# Patient Record
Sex: FEMALE | Race: OTHER | HISPANIC OR LATINO
[De-identification: names, ages, dates, MRNs, and addresses within clinical notes are randomized per-mention and may not be internally consistent; named-entity substitution may affect disease eponyms.]

---

## 2018-02-08 VITALS
HEIGHT: 65 IN | WEIGHT: 108.69 LBS | OXYGEN SATURATION: 100 % | HEART RATE: 65 BPM | DIASTOLIC BLOOD PRESSURE: 70 MMHG | SYSTOLIC BLOOD PRESSURE: 153 MMHG | TEMPERATURE: 98 F | RESPIRATION RATE: 18 BRPM

## 2018-02-09 ENCOUNTER — RESULT REVIEW (OUTPATIENT)
Age: 68
End: 2018-02-09

## 2018-02-09 ENCOUNTER — OUTPATIENT (OUTPATIENT)
Dept: OUTPATIENT SERVICES | Facility: HOSPITAL | Age: 68
LOS: 1 days | Discharge: HOME | End: 2018-02-09
Payer: MEDICARE

## 2018-02-09 DIAGNOSIS — Z98.890 OTHER SPECIFIED POSTPROCEDURAL STATES: Chronic | ICD-10-CM

## 2018-02-09 LAB — CALCIUM SERPL-MCNC: 8.6 MG/DL — SIGNIFICANT CHANGE UP (ref 8.4–10.5)

## 2018-02-09 RX ORDER — MORPHINE SULFATE 50 MG/1
2 CAPSULE, EXTENDED RELEASE ORAL
Qty: 0 | Refills: 0 | Status: DISCONTINUED | OUTPATIENT
Start: 2018-02-09 | End: 2018-02-10

## 2018-02-09 RX ORDER — SENNA PLUS 8.6 MG/1
2 TABLET ORAL AT BEDTIME
Qty: 0 | Refills: 0 | Status: DISCONTINUED | OUTPATIENT
Start: 2018-02-09 | End: 2018-02-10

## 2018-02-09 RX ORDER — LACTOBACILLUS ACIDOPHILUS 100MM CELL
1 CAPSULE ORAL
Qty: 0 | Refills: 0 | Status: DISCONTINUED | OUTPATIENT
Start: 2018-02-09 | End: 2018-02-10

## 2018-02-09 RX ORDER — SODIUM CHLORIDE 9 MG/ML
1000 INJECTION, SOLUTION INTRAVENOUS
Qty: 0 | Refills: 0 | Status: DISCONTINUED | OUTPATIENT
Start: 2018-02-09 | End: 2018-02-10

## 2018-02-09 RX ORDER — ONDANSETRON 8 MG/1
4 TABLET, FILM COATED ORAL EVERY 4 HOURS
Qty: 0 | Refills: 0 | Status: DISCONTINUED | OUTPATIENT
Start: 2018-02-09 | End: 2018-02-10

## 2018-02-09 RX ORDER — SIMVASTATIN 20 MG/1
40 TABLET, FILM COATED ORAL AT BEDTIME
Qty: 0 | Refills: 0 | Status: DISCONTINUED | OUTPATIENT
Start: 2018-02-09 | End: 2018-02-10

## 2018-02-09 RX ORDER — DOCUSATE SODIUM 100 MG
100 CAPSULE ORAL THREE TIMES A DAY
Qty: 0 | Refills: 0 | Status: DISCONTINUED | OUTPATIENT
Start: 2018-02-09 | End: 2018-02-10

## 2018-02-09 RX ORDER — LABETALOL HCL 100 MG
10 TABLET ORAL EVERY 4 HOURS
Qty: 0 | Refills: 0 | Status: DISCONTINUED | OUTPATIENT
Start: 2018-02-09 | End: 2018-02-10

## 2018-02-09 RX ORDER — BENZOCAINE AND MENTHOL 5; 1 G/100ML; G/100ML
1 LIQUID ORAL
Qty: 0 | Refills: 0 | Status: DISCONTINUED | OUTPATIENT
Start: 2018-02-09 | End: 2018-02-10

## 2018-02-09 RX ORDER — ACETAMINOPHEN 500 MG
650 TABLET ORAL EVERY 6 HOURS
Qty: 0 | Refills: 0 | Status: DISCONTINUED | OUTPATIENT
Start: 2018-02-09 | End: 2018-02-10

## 2018-02-09 RX ORDER — HYDRALAZINE HCL 50 MG
10 TABLET ORAL
Qty: 0 | Refills: 0 | Status: DISCONTINUED | OUTPATIENT
Start: 2018-02-09 | End: 2018-02-10

## 2018-02-09 RX ORDER — ACETAMINOPHEN WITH CODEINE 300MG-30MG
1 TABLET ORAL EVERY 4 HOURS
Qty: 0 | Refills: 0 | Status: DISCONTINUED | OUTPATIENT
Start: 2018-02-09 | End: 2018-02-10

## 2018-02-09 RX ORDER — METOCLOPRAMIDE HCL 10 MG
10 TABLET ORAL EVERY 6 HOURS
Qty: 0 | Refills: 0 | Status: DISCONTINUED | OUTPATIENT
Start: 2018-02-09 | End: 2018-02-10

## 2018-02-09 RX ADMIN — Medication 300 MILLIGRAM(S): at 23:10

## 2018-02-09 RX ADMIN — SIMVASTATIN 40 MILLIGRAM(S): 20 TABLET, FILM COATED ORAL at 23:09

## 2018-02-09 RX ADMIN — MORPHINE SULFATE 2 MILLIGRAM(S): 50 CAPSULE, EXTENDED RELEASE ORAL at 11:33

## 2018-02-09 RX ADMIN — Medication 1 TABLET(S): at 19:20

## 2018-02-09 RX ADMIN — MORPHINE SULFATE 2 MILLIGRAM(S): 50 CAPSULE, EXTENDED RELEASE ORAL at 12:00

## 2018-02-09 RX ADMIN — Medication 2 TABLET(S): at 14:18

## 2018-02-09 RX ADMIN — Medication 1 TABLET(S): at 18:57

## 2018-02-09 RX ADMIN — Medication 300 MILLIGRAM(S): at 14:18

## 2018-02-09 RX ADMIN — SENNA PLUS 2 TABLET(S): 8.6 TABLET ORAL at 23:09

## 2018-02-09 RX ADMIN — Medication 100 MILLIGRAM(S): at 14:18

## 2018-02-09 RX ADMIN — Medication 2 TABLET(S): at 23:10

## 2018-02-09 NOTE — PROGRESS NOTE ADULT - SUBJECTIVE AND OBJECTIVE BOX
[facility] POSTOP CHECK NOTE    POD0 total thyroidectomy. Tolerated well. Transferred to floor. Tolerating liquids. Pain well controlled. Denies n/v, CP, SOB, paresthesias.     Vital Signs Last 24 Hrs  T(C): 36.2 (09 Feb 2018 15:14), Max: 36.8 (09 Feb 2018 13:00)  T(F): 97.1 (09 Feb 2018 15:14), Max: 98.2 (09 Feb 2018 13:00)  HR: 58 (09 Feb 2018 15:14) (54 - 80)  BP: 140/63 (09 Feb 2018 15:14) (139/57 - 154/67)  BP(mean): 88 (09 Feb 2018 13:00) (87 - 102)  RR: 16 (09 Feb 2018 15:14) (13 - 21)  SpO2: 98% (09 Feb 2018 15:14) (96% - 100%)    PE:  NAD, alert and appropriate  nonlabored respirations on RA  voice strong and clear  neck incision c/d/i, soft, flat, mild ttp  no hematoma  villeda donavan dressing in place  face symmetric  massimo MOTTA    Ca 8.6    A/P: [facility] POSTOP CHECK NOTE    POD0 total thyroidectomy. Tolerated well. Transferred to floor. Tolerating liquids. Pain well controlled. Denies n/v, CP, SOB, paresthesias.     Vital Signs Last 24 Hrs  T(C): 36.2 (09 Feb 2018 15:14), Max: 36.8 (09 Feb 2018 13:00)  T(F): 97.1 (09 Feb 2018 15:14), Max: 98.2 (09 Feb 2018 13:00)  HR: 58 (09 Feb 2018 15:14) (54 - 80)  BP: 140/63 (09 Feb 2018 15:14) (139/57 - 154/67)  BP(mean): 88 (09 Feb 2018 13:00) (87 - 102)  RR: 16 (09 Feb 2018 15:14) (13 - 21)  SpO2: 98% (09 Feb 2018 15:14) (96% - 100%)    PE:  NAD, alert and appropriate  nonlabored respirations on RA  voice strong and clear  neck incision c/d/i, soft, flat, mild ttp  no hematoma  villeda donavan dressing in place  face symmetric  MOTTA, wwp    Ca 8.6    A/P:  67F s/p total thyroidectomy    -prn pain  -prn nausea  -prn HTN  -up ad lina  -Ancef for abx ppx  -regular diet as tolerated  -IVF utnil taking in PO  -AM ca  -Ca-VitD TID  -restart home meds  -DVT ppx: SCDs  -IS    Dispo: regional room, 23 hrs    seen with chief and d/w attending

## 2018-02-10 VITALS
RESPIRATION RATE: 19 BRPM | SYSTOLIC BLOOD PRESSURE: 134 MMHG | HEART RATE: 63 BPM | OXYGEN SATURATION: 96 % | TEMPERATURE: 98 F | DIASTOLIC BLOOD PRESSURE: 63 MMHG

## 2018-02-10 LAB — CALCIUM SERPL-MCNC: 9.5 MG/DL — SIGNIFICANT CHANGE UP (ref 8.4–10.5)

## 2018-02-10 PROCEDURE — 60240 REMOVAL OF THYROID: CPT

## 2018-02-10 PROCEDURE — 82310 ASSAY OF CALCIUM: CPT

## 2018-02-10 PROCEDURE — 88307 TISSUE EXAM BY PATHOLOGIST: CPT

## 2018-02-10 PROCEDURE — 60512 AUTOTRANSPLANT PARATHYROID: CPT

## 2018-02-10 PROCEDURE — 15733 MUSC MYOQ/FSCQ FLP H&N PEDCL: CPT

## 2018-02-10 PROCEDURE — 36415 COLL VENOUS BLD VENIPUNCTURE: CPT

## 2018-02-10 PROCEDURE — C1889: CPT

## 2018-02-10 RX ORDER — LACTOBACILLUS ACIDOPHILUS 100MM CELL
1 CAPSULE ORAL ONCE
Qty: 0 | Refills: 0 | Status: DISCONTINUED | OUTPATIENT
Start: 2018-02-10 | End: 2018-02-10

## 2018-02-10 RX ADMIN — Medication 100 MILLIGRAM(S): at 06:48

## 2018-02-10 RX ADMIN — Medication 2 TABLET(S): at 06:48

## 2018-02-10 RX ADMIN — Medication 300 MILLIGRAM(S): at 06:48

## 2018-02-10 RX ADMIN — Medication 1 TABLET(S): at 07:06

## 2018-02-10 NOTE — PROGRESS NOTE ADULT - SUBJECTIVE AND OBJECTIVE BOX
ENT [facility] DAILY PROGRESS NOTE    HPI/Interval:  s/p total thyroidectomy 2/9. Tolerated well. Transferred to floor. Tolerating liquids. Pain well controlled. Denies n/v, CP, SOB, paresthesias.   2/10: pain controlled. no numbness/tingling of fingers/toes/lips. tolerating PO. ambulating. queen donavan dressing removed. ready for discharge    MEDICATIONS:  Antiinfectives:   clindamycin   Capsule 300 milliGRAM(s) Oral three times a day    IV fluids:  calcium carbonate 1250 mG + Vitamin D (OsCal 500 + D) 2 Tablet(s) Oral three times a day  lactated ringers. 1000 milliLiter(s) IV Continuous <Continuous>    Pain medications/Neuro:  acetaminophen   Tablet. 650 milliGRAM(s) Oral every 6 hours PRN  acetaminophen 300 mG/codeine 30 mG 1 Tablet(s) Oral every 4 hours PRN  metoclopramide Injectable 10 milliGRAM(s) IV Push every 6 hours PRN  morphine  - Injectable 2 milliGRAM(s) IV Push every 30 minutes PRN  ondansetron Injectable 4 milliGRAM(s) IV Push every 4 hours PRN    Endocrine Medications:   simvastatin 40 milliGRAM(s) Oral at bedtime    All other standing medications:   docusate sodium 100 milliGRAM(s) Oral three times a day  lactobacillus acidophilus 1 Tablet(s) Oral two times a day with meals  lactobacillus acidophilus 1 Tablet(s) Oral once  senna 2 Tablet(s) Oral at bedtime    All other PRN medications:  benzocaine 15 mG/menthol 3.6 mG Lozenge 1 Lozenge Oral every 1 hour PRN  hydrALAZINE 10 milliGRAM(s) Oral every 1 hour PRN  labetalol Injectable 10 milliGRAM(s) IV Push every 4 hours PRN      Vital Signs Last 24 Hrs  T(C): 36.9 (10 Feb 2018 05:01), Max: 37.1 (09 Feb 2018 20:47)  T(F): 98.5 (10 Feb 2018 05:01), Max: 98.8 (09 Feb 2018 20:47)  HR: 63 (10 Feb 2018 05:01) (54 - 80)  BP: 134/63 (10 Feb 2018 05:01) (118/55 - 154/67)  BP(mean): 88 (09 Feb 2018 13:00) (87 - 102)  RR: 19 (10 Feb 2018 05:01) (13 - 21)  SpO2: 96% (10 Feb 2018 05:01) (96% - 100%)      02-09 @ 07:01  -  02-10 @ 07:00  --------------------------------------------------------  IN:    lactated ringers.: 675 mL    Oral Fluid: 280 mL  Total IN: 955 mL    OUT:    Voided: 2450 mL  Total OUT: 2450 mL    Total NET: -1495 mL            PHYSICAL EXAM:  PE:  NAD, alert and appropriate  nonlabored respirations on RA  voice strong and clear  neck incision c/d/i, soft, flat, mild ttp  no hematoma  face symmetric  MOTTA, wwp        Endocrine Panel-  Calcium, Total Serum: 9.5 mg/dL (02-10 @ 08:03)  Calcium, Total Serum: 8.6 mg/dL (02-09 @ 16:20)        A/P:  67F s/p total thyroidectomy doing well  - discharge on pain control and calcium regimen  - discussed with dr. jerez  - please page ENt with any questions

## 2018-02-14 LAB — SURGICAL PATHOLOGY STUDY: SIGNIFICANT CHANGE UP

## 2019-06-28 PROBLEM — R22.9 LOCALIZED SWELLING, MASS AND LUMP, UNSPECIFIED: Chronic | Status: ACTIVE | Noted: 2018-02-08

## 2019-06-28 PROBLEM — E78.5 HYPERLIPIDEMIA, UNSPECIFIED: Chronic | Status: ACTIVE | Noted: 2018-02-08

## 2019-07-10 ENCOUNTER — APPOINTMENT (OUTPATIENT)
Dept: ENDOCRINOLOGY | Facility: CLINIC | Age: 69
End: 2019-07-10
Payer: MEDICARE

## 2019-07-10 VITALS
HEIGHT: 61 IN | BODY MASS INDEX: 21.14 KG/M2 | OXYGEN SATURATION: 96 % | RESPIRATION RATE: 16 BRPM | WEIGHT: 112 LBS | TEMPERATURE: 97.4 F | DIASTOLIC BLOOD PRESSURE: 73 MMHG | SYSTOLIC BLOOD PRESSURE: 157 MMHG | HEART RATE: 74 BPM

## 2019-07-10 DIAGNOSIS — Z00.00 ENCOUNTER FOR GENERAL ADULT MEDICAL EXAMINATION W/OUT ABNORMAL FINDINGS: ICD-10-CM

## 2019-07-10 DIAGNOSIS — F17.200 NICOTINE DEPENDENCE, UNSPECIFIED, UNCOMPLICATED: ICD-10-CM

## 2019-07-10 PROCEDURE — 99406 BEHAV CHNG SMOKING 3-10 MIN: CPT

## 2019-07-10 PROCEDURE — 99204 OFFICE O/P NEW MOD 45 MIN: CPT | Mod: 25

## 2019-07-10 RX ORDER — CHOLECALCIFEROL (VITAMIN D3) 125 MCG
TABLET ORAL
Refills: 0 | Status: ACTIVE | COMMUNITY

## 2019-07-10 NOTE — PHYSICAL EXAM
[No Acute Distress] : no acute distress [Alert] : alert [Well Developed] : well developed [Well Nourished] : well nourished [Normal Sclera/Conjunctiva] : normal sclera/conjunctiva [Normal Oropharynx] : the oropharynx was normal [No Proptosis] : no proptosis [EOMI] : extra ocular movement intact [No Respiratory Distress] : no respiratory distress [Clear to Auscultation] : lungs were clear to auscultation bilaterally [Normal Rate] : heart rate was normal  [No Accessory Muscle Use] : no accessory muscle use [Normal S1, S2] : normal S1 and S2 [Regular Rhythm] : with a regular rhythm [Pedal Pulses Normal] : the pedal pulses are present [No Edema] : there was no peripheral edema [Normal Bowel Sounds] : normal bowel sounds [Not Tender] : non-tender [Soft] : abdomen soft [Not Distended] : not distended [No Spinal Tenderness] : no spinal tenderness [Normal Gait] : normal gait [No Stigmata of Cushings Syndrome] : no stigmata of cushings syndrome [Spine Straight] : spine straight [No Rash] : no rash [Normal Strength/Tone] : muscle strength and tone were normal [Normal Reflexes] : deep tendon reflexes were 2+ and symmetric [No Tremors] : no tremors [Oriented x3] : oriented to person, place, and time [de-identified] : well healed scar at base of the neck, no palpable thyroid tissue [Acanthosis Nigricans] : no acanthosis nigricans

## 2019-07-10 NOTE — HISTORY OF PRESENT ILLNESS
[FreeTextEntry1] : 69 year old female with PMH of papillary thyroid cancer s/p total thyroidectomy 2/2019, [id number], post surgical hypothyroidism, HLD, and vitamin D deficiency presented to [facility] care.\par \par Patient was previously following with endocrinologist, Dr. Dodd, in [place]. \par \par Patient reports longstanding history of a thyroid nodule. She underwent FNA last year followed by total thyroidectomy. She does not have the FNA reports. Pathology report from total thyroidectomy on 2/14/18 reveals a pT1pNx 0.3 cm unifocal left lobe PTC, hypervasplastic nodules, and NIFTP. \par \par Recent blood work reviewed: 4/08/19\par \par TG 72\par TSH 1.78\par Total T4 10.6\par FTI 3.6\par A1c 5.5%\par vitamin D 53\par \par No recent TG/ TG abx. Patient reports last thyroid ultrasound was performed over 1 year ago. \par \par She reports feeling well overall. She denies shortness of breath, dysphagia, or change in neck. She also denies tremors, palpitations, or anxiety. She denies recent change in weight.\par She takes Synthroid 100mcg PO daily. She reports strict compliance with the medication. She reports taking the medication first thing in the morning on an empty stomach, waits 1 hr to eat, and does not take with any other medications. \par \par Patient with BP above goal in office today at 157/73. She reports that she follows regularly with PMD and has never had a problem with her BP. She attributes the elevation to stress from coming into the appointment late. \par She is currently taking ezetimibe for HLD and tries to follow a low fat diet. \par Patient reports she is a current smoker, not yet ready to quit.

## 2019-08-07 ENCOUNTER — RX RENEWAL (OUTPATIENT)
Age: 69
End: 2019-08-07

## 2019-08-15 ENCOUNTER — RX RENEWAL (OUTPATIENT)
Age: 69
End: 2019-08-15

## 2019-08-19 ENCOUNTER — MEDICATION RENEWAL (OUTPATIENT)
Age: 69
End: 2019-08-19

## 2020-01-08 ENCOUNTER — APPOINTMENT (OUTPATIENT)
Dept: ENDOCRINOLOGY | Facility: CLINIC | Age: 70
End: 2020-01-08
Payer: MEDICARE

## 2020-01-08 VITALS
BODY MASS INDEX: 21.71 KG/M2 | HEIGHT: 61 IN | TEMPERATURE: 97.7 F | SYSTOLIC BLOOD PRESSURE: 162 MMHG | WEIGHT: 115 LBS | RESPIRATION RATE: 16 BRPM | DIASTOLIC BLOOD PRESSURE: 79 MMHG | HEART RATE: 72 BPM | OXYGEN SATURATION: 97 %

## 2020-01-08 VITALS — DIASTOLIC BLOOD PRESSURE: 76 MMHG | SYSTOLIC BLOOD PRESSURE: 128 MMHG

## 2020-01-08 DIAGNOSIS — I10 ESSENTIAL (PRIMARY) HYPERTENSION: ICD-10-CM

## 2020-01-08 LAB
25(OH)D3 SERPL-MCNC: 31.2 NG/ML
T4 FREE SERPL-MCNC: 1.7 NG/DL
TSH SERPL-ACNC: 0.97 UIU/ML

## 2020-01-08 PROCEDURE — 99214 OFFICE O/P EST MOD 30 MIN: CPT | Mod: 25

## 2020-01-08 PROCEDURE — 99406 BEHAV CHNG SMOKING 3-10 MIN: CPT

## 2020-01-08 NOTE — REVIEW OF SYSTEMS
[Fatigue] : no fatigue [Decreased Appetite] : appetite not decreased [Visual Field Defect] : no visual field defect [Blurry Vision] : no blurred vision [Dysphagia] : no dysphagia [Dysphonia] : no dysphonia [Chest Pain] : no chest pain [Palpitations] : no palpitations [Shortness Of Breath] : no shortness of breath [Wheezing] : no wheezing was heard [Cough] : no cough [Nausea] : no nausea [Vomiting] : no vomiting was observed [Constipation] : no constipation [Diarrhea] : no diarrhea [Dysuria] : no dysuria [Incontinence] : no incontinence [Hair Loss] : no hair loss [Dry Skin] : no dry skin [Headache] : no headaches [Tremors] : no tremors [Dizziness] : no dizziness [Depression] : no depression [Anxiety] : no anxiety [Cold Intolerance] : cold tolerant [Heat Intolerance] : heat tolerant [Swelling] : no swelling [Lymphadenopathy] : no lymphadenopathy

## 2020-01-08 NOTE — HISTORY OF PRESENT ILLNESS
[FreeTextEntry1] : 69 year old female with PMH of papillary thyroid cancer s/p total thyroidectomy 2/2019, [id number], post surgical hypothyroidism, HLD, and vitamin D deficiency presented for follow up of thyroid cancer.\par \par Patient reports longstanding history of a thyroid nodule. She underwent FNA in 2019 followed by total thyroidectomy. She does not have the FNA reports. Pathology report from total thyroidectomy on 2/14/18 reveals a pT1pNx 0.3 cm unifocal left lobe PTC, hyperplastic nodules, and NIFTP. Last TSH in 4/2019 was at goal at 1.78 on current dose of levothyroxine 100mcg PO daily. \par \par No recent TG/ TG abx. Thyroid ultrasound following last visit in 8/2019 showed no evidence of disease recurrence. \par \par She reports feeling well overall. She denies shortness of breath, dysphagia, or change in neck. She also denies tremors, palpitations, or anxiety. She denies recent change in weight.\par She takes Synthroid 100mcg PO daily. She reports strict compliance with the medication. She reports taking the medication first thing in the morning on an empty stomach, waits 1 hr to eat, and does not take with any other medications. \par \par Patient with BP again above goal in office today 162/79 but repeat was 128/76. She reports that she follows regularly with PMD and has never had a problem with her BP. She attributes the elevation to stress from coming into the appointment late. \par Patient reports she is a current smoker, not yet ready to quit. \par

## 2020-01-08 NOTE — PHYSICAL EXAM
[Alert] : alert [No Acute Distress] : no acute distress [Well Nourished] : well nourished [Well Developed] : well developed [Normal Sclera/Conjunctiva] : normal sclera/conjunctiva [Normal Oropharynx] : the oropharynx was normal [No Proptosis] : no proptosis [EOMI] : extra ocular movement intact [Clear to Auscultation] : lungs were clear to auscultation bilaterally [No Accessory Muscle Use] : no accessory muscle use [No Respiratory Distress] : no respiratory distress [Normal Rate] : heart rate was normal  [Normal S1, S2] : normal S1 and S2 [Regular Rhythm] : with a regular rhythm [No Edema] : there was no peripheral edema [Pedal Pulses Normal] : the pedal pulses are present [Normal Bowel Sounds] : normal bowel sounds [Not Tender] : non-tender [Not Distended] : not distended [Soft] : abdomen soft [No Spinal Tenderness] : no spinal tenderness [Spine Straight] : spine straight [No Stigmata of Cushings Syndrome] : no stigmata of cushings syndrome [Normal Gait] : normal gait [Normal Strength/Tone] : muscle strength and tone were normal [No Rash] : no rash [No Tremors] : no tremors [Normal Reflexes] : deep tendon reflexes were 2+ and symmetric [Oriented x3] : oriented to person, place, and time [Acanthosis Nigricans] : no acanthosis nigricans [de-identified] : well healed scar at base of the neck, no palpable thyroid tissue

## 2020-01-08 NOTE — ASSESSMENT
[FreeTextEntry1] : 1. Thyroid Cancer \par -Patient with history of PTC ([id number]) 0.3cm, NIFTP, and hyperplastic nodules s/p total thyroidectomy 2/2018. This has low risk for recurrence as per IRIS guidelines. \par -patient is clinically euthyroid but no recent blood work\par -Goal TSH 0.5-2.0 given low risk of recurrence\par -recent thyroid ultrasound from 8/2019 showed no evidence of disease recurrence\par -repeat thyroid ultrasound annually (due 8/2020)\par -obtain TG and TG antibodies now \par \par 2. Patient with post-surgical hypothyroidism secondary to total thyroidectomy \par -patient is clinically euthyroid, no recent blood work\par -obtain TFTs now\par -TSH goal 0.5-2.0\par -continue current dose of Synthroid 100mcg PO daily, will adjust as needed pending results of blood work\par -Discussed how to take Synthroid appropriately, first thing in the morning, on an empty stomach, wait at least 1/2 hr to eat, do not take with any other medications.\par \par 3.Vitamin D deficiency\par -continue vitamin D supplementation\par -obtain vitamin D level now\par \par 4. Current Smoker\par I spoke with the patient for approximately 5 minutes about the many benefits of smoking cessation. I advised her of the harmful effects tobacco use has on morbidity and mortality. She is not yet ready to quit. \par \par

## 2020-01-09 LAB
THYROGLOB AB SERPL-ACNC: <20 IU/ML
THYROGLOB SERPL-MCNC: 2.36 NG/ML

## 2020-02-04 RX ORDER — EZETIMIBE 10 MG/1
10 TABLET ORAL
Qty: 30 | Refills: 3 | Status: ACTIVE | COMMUNITY
Start: 1900-01-01 | End: 1900-01-01

## 2020-05-08 ENCOUNTER — APPOINTMENT (OUTPATIENT)
Dept: ENDOCRINOLOGY | Facility: CLINIC | Age: 70
End: 2020-05-08
Payer: MEDICARE

## 2020-05-08 VITALS
RESPIRATION RATE: 17 BRPM | SYSTOLIC BLOOD PRESSURE: 154 MMHG | WEIGHT: 130 LBS | HEIGHT: 61 IN | OXYGEN SATURATION: 98 % | DIASTOLIC BLOOD PRESSURE: 73 MMHG | TEMPERATURE: 98.2 F | HEART RATE: 78 BPM | BODY MASS INDEX: 24.55 KG/M2

## 2020-05-08 DIAGNOSIS — E89.0 POSTPROCEDURAL HYPOTHYROIDISM: ICD-10-CM

## 2020-05-08 DIAGNOSIS — F17.200 NICOTINE DEPENDENCE, UNSPECIFIED, UNCOMPLICATED: ICD-10-CM

## 2020-05-08 PROCEDURE — 99406 BEHAV CHNG SMOKING 3-10 MIN: CPT

## 2020-05-08 PROCEDURE — 99214 OFFICE O/P EST MOD 30 MIN: CPT | Mod: 25

## 2020-05-08 NOTE — PHYSICAL EXAM
[Alert] : alert [No Acute Distress] : no acute distress [Well Nourished] : well nourished [Well Developed] : well developed [Normal Sclera/Conjunctiva] : normal sclera/conjunctiva [EOMI] : extra ocular movement intact [No Proptosis] : no proptosis [Normal Oropharynx] : the oropharynx was normal [No Thyroid Nodules] : no palpable thyroid nodules [Thyroid Not Enlarged] : the thyroid was not enlarged [Well Healed Scar] : well healed scar [No Respiratory Distress] : no respiratory distress [No Accessory Muscle Use] : no accessory muscle use [Normal S1, S2] : normal S1 and S2 [Normal Rate] : heart rate was normal [Clear to Auscultation] : lungs were clear to auscultation bilaterally [Regular Rhythm] : with a regular rhythm [No Edema] : no peripheral edema [Normal Bowel Sounds] : normal bowel sounds [Pedal Pulses Normal] : the pedal pulses are present [Not Tender] : non-tender [Not Distended] : not distended [Soft] : abdomen soft [No Spinal Tenderness] : no spinal tenderness [No Stigmata of Cushings Syndrome] : no stigmata of Cushings Syndrome [Spine Straight] : spine straight [Normal Gait] : normal gait [Normal Strength/Tone] : muscle strength and tone were normal [No Rash] : no rash [Normal Reflexes] : deep tendon reflexes were 2+ and symmetric [No Tremors] : no tremors [Oriented x3] : oriented to person, place, and time [Acanthosis Nigricans] : no acanthosis nigricans

## 2020-05-08 NOTE — ASSESSMENT
[FreeTextEntry1] : 1. Thyroid Cancer \par -Patient with history of PTC ([id number]) 0.3cm, NIFTP, and hyperplastic nodules s/p total thyroidectomy 2/2018. This has low risk for recurrence as per IRIS guidelines. \par -patient is clinically and biochemically euthyroid\par -Goal TSH 0.5-2.0 given low risk of recurrence\par -recent thyroid ultrasound from 8/2019 showed no evidence of disease recurrence\par -repeat thyroid ultrasound annually (due 8/2020)\par -obtain TSH, TG, TG abx q 6 months\par \par 2. Patient with post-surgical hypothyroidism secondary to total thyroidectomy \par -patient is clinically and biochemically euthyroid\par -TSH at goal 0.5-2.0\par -continue current dose of Synthroid 100mcg PO daily\par -Discussed how to take Synthroid appropriately, first thing in the morning, on an empty stomach, wait at least 1/2 hr to eat, do not take with any other medications.\par \par 3.Vitamin D deficiency\par -vitamin D at goal\par -continue vitamin D supplementation\par \par 4. Current Smoker\par -I spoke with the patient for approximately 5 minutes about the many benefits of smoking cessation. I advised her of the harmful effects tobacco use has on morbidity and mortality. She is not yet ready to quit. \par \par  [Smoking Cessation] : smoking cessation

## 2020-05-08 NOTE — HISTORY OF PRESENT ILLNESS
[FreeTextEntry1] : 69 year old female with PMH of papillary thyroid cancer s/p total thyroidectomy 2/2019, [id number], post surgical hypothyroidism, HLD, and vitamin D deficiency presented for follow up of thyroid cancer.\par \par Pathology report from total thyroidectomy on 2/14/18 reveals a pT1pNx 0.3 cm unifocal left lobe PTC, hyperplastic nodules, and NIFTP. Last TSH in 1/2020 was at goal at 0.9 on current dose of levothyroxine 100mcg PO daily. \par \par Recent TG was 2.3 with undetectable TG abx <20. Thyroid ultrasound following last visit in 8/2019 showed no evidence of disease recurrence. \par \par She reports feeling well overall. She denies shortness of breath, dysphagia, or change in neck. She also denies tremors, palpitations, or anxiety. She denies recent change in weight.\par She takes Synthroid 100mcg PO daily. She reports strict compliance with the medication. She reports taking the medication first thing in the morning on an empty stomach, waits 1 hr to eat, and does not take with any other medications. \par \par Patient reports she is a current smoker, not yet ready to quit. Smoking 2 cigarettes daily. \par \par Recent vitamin D level at goal 31.2.

## 2020-05-08 NOTE — REVIEW OF SYSTEMS
[Fatigue] : no fatigue [Decreased Appetite] : appetite not decreased [Visual Field Defect] : no visual field defect [Dry Eyes] : no dryness [Dysphagia] : no dysphagia [Dysphonia] : no dysphonia [Neck Pain] : no neck pain [Chest Pain] : no chest pain [Palpitations] : no palpitations [Shortness Of Breath] : no shortness of breath [Nausea] : no nausea [Cough] : no cough [Constipation] : no constipation [Vomiting] : no vomiting [Diarrhea] : no diarrhea [Dysuria] : no dysuria [Incontinence] : no incontinence [Joint Pain] : no joint pain [Muscle Weakness] : no muscle weakness [Dry Skin] : no dry skin [Hair Loss] : no hair loss [Dizziness] : no dizziness [Headaches] : no headaches [Depression] : no depression [Tremors] : no tremors [Anxiety] : no anxiety [Heat Intolerance] : no heat intolerance [Swelling] : no swelling [Cold Intolerance] : no cold intolerance [Lymphadenopathy] : no lymphadenopathy

## 2020-11-03 ENCOUNTER — APPOINTMENT (OUTPATIENT)
Dept: ENDOCRINOLOGY | Facility: CLINIC | Age: 70
End: 2020-11-03

## 2020-12-22 ENCOUNTER — APPOINTMENT (OUTPATIENT)
Dept: ENDOCRINOLOGY | Facility: CLINIC | Age: 70
End: 2020-12-22
Payer: MEDICARE

## 2020-12-22 VITALS
TEMPERATURE: 97.5 F | BODY MASS INDEX: 21.08 KG/M2 | HEIGHT: 62.2 IN | HEART RATE: 69 BPM | DIASTOLIC BLOOD PRESSURE: 56 MMHG | SYSTOLIC BLOOD PRESSURE: 127 MMHG | WEIGHT: 116 LBS

## 2020-12-22 DIAGNOSIS — E55.9 VITAMIN D DEFICIENCY, UNSPECIFIED: ICD-10-CM

## 2020-12-22 DIAGNOSIS — C73 MALIGNANT NEOPLASM OF THYROID GLAND: ICD-10-CM

## 2020-12-22 PROCEDURE — 36415 COLL VENOUS BLD VENIPUNCTURE: CPT

## 2020-12-22 PROCEDURE — 99214 OFFICE O/P EST MOD 30 MIN: CPT | Mod: 25

## 2020-12-22 PROCEDURE — 99072 ADDL SUPL MATRL&STAF TM PHE: CPT

## 2020-12-22 RX ORDER — FLUOCINONIDE 0.05 MG/G
0.05 OINTMENT TOPICAL
Qty: 60 | Refills: 0 | Status: ACTIVE | COMMUNITY
Start: 2020-07-13

## 2020-12-22 RX ORDER — MELOXICAM 7.5 MG/1
7.5 TABLET ORAL
Qty: 20 | Refills: 0 | Status: ACTIVE | COMMUNITY
Start: 2020-09-14

## 2020-12-22 RX ORDER — CLOTRIMAZOLE 10 MG/G
1 CREAM TOPICAL
Qty: 45 | Refills: 0 | Status: ACTIVE | COMMUNITY
Start: 2020-08-10

## 2020-12-22 NOTE — REASON FOR VISIT
[Initial Evaluation] : an initial evaluation [Thyroid Cancer] : thyroid cancer [[employer] ] : provided by [employer]   [FreeTextEntry1] : [id number] [FreeTextEntry2] : Pac[employer] [TWNoteComboBox1] : [place]

## 2020-12-22 NOTE — ASSESSMENT
[FreeTextEntry1] : This is a 70-year-old female with papillary thyroid microcarcinoma, and I FTP, postsurgical hypothyroidism, hyperlipidemia, vitamin D insufficiency, here for initial evaluation.\par Check TFTs.\par Check thyroglobulin and thyroglobulin antibodies.\par Continue levothyroxine 100 mcg daily pending results.\par Thyroid sonogram from September 11, 2020 showed status post total thyroidectomy and nonspecific bilateral cervical lymph nodes.\par Check thyroid sonogram to follow lymph nodes.\par Check lipid panel.\par Check 25 vitamin D.

## 2020-12-22 NOTE — PHYSICAL EXAM
[Alert] : alert [Well Nourished] : well nourished [Healthy Appearance] : healthy appearance [No Acute Distress] : no acute distress [Well Developed] : well developed [Normal Voice/Communication] : normal voice communication [Normal Sclera/Conjunctiva] : normal sclera/conjunctiva [No Proptosis] : no proptosis [No Neck Mass] : no neck mass was observed [No LAD] : no lymphadenopathy [Supple] : the neck was supple [No Thyroid Nodules] : no palpable thyroid nodules [Well Healed Scar] : well healed scar [No Respiratory Distress] : no respiratory distress [No Accessory Muscle Use] : no accessory muscle use [Normal Rate and Effort] : normal respiratory rate and effort [Normal Rate] : heart rate was normal [Spine Straight] : spine straight [No Stigmata of Cushings Syndrome] : no stigmata of Cushings Syndrome [Normal Gait] : normal gait [No Clubbing, Cyanosis] : no clubbing  or cyanosis of the fingernails [No Involuntary Movements] : no involuntary movements were seen [No Tremors] : no tremors [Normal Affect] : the affect was normal [Normal Insight/Judgement] : insight and judgment were intact [Normal Mood] : the mood was normal [Acanthosis Nigricans] : no acanthosis nigricans

## 2020-12-22 NOTE — HISTORY OF PRESENT ILLNESS
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  Margins uninvolved by carcinoma, no angioinvasion, no lymphatic invasion, no extrathyroidal extension, no lymph nodes present.\par She is currently on levothyroxine 100 mcg daily.  She takes this in the morning on an empty stomach.  She is on ezetimibe 10 mg daily.

## 2020-12-23 DIAGNOSIS — E78.5 HYPERLIPIDEMIA, UNSPECIFIED: ICD-10-CM

## 2020-12-23 LAB
25(OH)D3 SERPL-MCNC: 27.4 NG/ML
ALBUMIN SERPL ELPH-MCNC: 4.5 G/DL
ALP BLD-CCNC: 69 U/L
ALT SERPL-CCNC: 14 U/L
ANION GAP SERPL CALC-SCNC: 11 MMOL/L
AST SERPL-CCNC: 22 U/L
BILIRUB SERPL-MCNC: <0.2 MG/DL
BUN SERPL-MCNC: 11 MG/DL
CALCIUM SERPL-MCNC: 9.2 MG/DL
CHLORIDE SERPL-SCNC: 103 MMOL/L
CO2 SERPL-SCNC: 26 MMOL/L
CREAT SERPL-MCNC: 0.8 MG/DL
GLUCOSE SERPL-MCNC: 107 MG/DL
POTASSIUM SERPL-SCNC: 4 MMOL/L
PROT SERPL-MCNC: 7.3 G/DL
SODIUM SERPL-SCNC: 141 MMOL/L
T4 FREE SERPL-MCNC: 1.6 NG/DL
TSH SERPL-ACNC: 1.72 UIU/ML

## 2020-12-29 LAB
THYROGLOB AB SERPL-ACNC: <20 IU/ML
THYROGLOB SERPL-MCNC: 1.04 NG/ML

## 2020-12-29 RX ORDER — LEVOTHYROXINE SODIUM 0.11 MG/1
112 TABLET ORAL DAILY
Qty: 90 | Refills: 2 | Status: COMPLETED | COMMUNITY
Start: 2020-12-29 | End: 2021-09-25

## 2020-12-29 RX ORDER — LEVOTHYROXINE SODIUM 0.1 MG/1
100 TABLET ORAL
Qty: 90 | Refills: 2 | Status: DISCONTINUED | COMMUNITY
Start: 1900-01-01 | End: 2020-12-29

## 2020-12-30 ENCOUNTER — NON-APPOINTMENT (OUTPATIENT)
Age: 70
End: 2020-12-30

## 2021-06-22 ENCOUNTER — APPOINTMENT (OUTPATIENT)
Dept: ENDOCRINOLOGY | Facility: CLINIC | Age: 71
End: 2021-06-22

## 2023-01-12 NOTE — BRIEF OPERATIVE NOTE - ASSISTANT(S)
Care Due:                  Date            Visit Type   Department     Provider  --------------------------------------------------------------------------------    Last Visit: None Found      None         None Found  Next Visit: None Scheduled  None         None Found                                                            Last  Test          Frequency    Reason                     Performed    Due Date  --------------------------------------------------------------------------------    Office Visit  12 months..  [id number]...............  Not Found    Overdue    [employer] Embedded Care Gaps. Reference number: [id number]. 1/12/2023   4:08:34 PM CST   Dr. Amisha Malloy